# Patient Record
Sex: FEMALE | Race: BLACK OR AFRICAN AMERICAN | NOT HISPANIC OR LATINO
[De-identification: names, ages, dates, MRNs, and addresses within clinical notes are randomized per-mention and may not be internally consistent; named-entity substitution may affect disease eponyms.]

---

## 2021-08-03 PROBLEM — Z00.00 ENCOUNTER FOR PREVENTIVE HEALTH EXAMINATION: Status: ACTIVE | Noted: 2021-08-03

## 2021-08-04 ENCOUNTER — APPOINTMENT (OUTPATIENT)
Dept: VASCULAR SURGERY | Facility: CLINIC | Age: 66
End: 2021-08-04

## 2022-10-05 ENCOUNTER — APPOINTMENT (OUTPATIENT)
Dept: VASCULAR SURGERY | Facility: CLINIC | Age: 67
End: 2022-10-05

## 2022-10-05 ENCOUNTER — NON-APPOINTMENT (OUTPATIENT)
Age: 67
End: 2022-10-05

## 2022-10-05 VITALS — WEIGHT: 244 LBS

## 2022-10-05 DIAGNOSIS — R60.0 LOCALIZED EDEMA: ICD-10-CM

## 2022-10-05 PROCEDURE — 99203 OFFICE O/P NEW LOW 30 MIN: CPT

## 2022-10-05 NOTE — HISTORY OF PRESENT ILLNESS
[FreeTextEntry1] : 67-year-old female with past medical history of hypertension presented for an evaluation of bilateral lower extremity edema associated with pain..  The symptoms are  chronic, present for years. Progressively worsened over time. Patient complains of tenderness, heaviness, burning and significant discomfort in legs.  The symptoms are least pronounced in the morning and worsens during the course of the day. Alleviated by leg elevation. Exacerbated by prolonged standing, activity.\par Patient attempted compressive therapy in the past with only a marginal improvement of the symptoms.\par No history of cellulitis, ulcers. \par No erythema. \par \par

## 2022-10-05 NOTE — REVIEW OF SYSTEMS
[As Noted in HPI] : as noted in HPI [Leg Claudication] : no intermittent leg claudication [Lower Ext Edema] : lower extremity edema [Negative] : Heme/Lymph

## 2022-10-05 NOTE — PHYSICAL EXAM
[JVD] : no jugular venous distention  [Calm] : calm [de-identified] : NAD.  Awake alert and oriented. [de-identified] : Normocephalic atraumatic.  Extraocular muscles intact. [FreeTextEntry1] : 2+ bilateral carotid, radial, femoral, popliteal, DP and PT pulses.\par Bilateral lower extremity 2+ edema.  Right worse than left.  Extensive venous stasis changes of bilateral lower extremity.  Multiple healed ulcers of right lower extremity.\par \par  [de-identified] : Soft, NT, ND. No guarding. No palpable masses. No HSM [de-identified] : Normal muscle bulk and tone. FROM in all extremities. [de-identified] : Right lower extremity multiple healed venous stasis ulcers.  Extensive venous stasis changes of bilateral lower extremities.  Right worse than left. [de-identified] : AAOx3, CN II-XII intact. Strength 5/5 in all 4 extremities. Sensation intact throughout.

## 2022-10-05 NOTE — ASSESSMENT
[FreeTextEntry1] : 67-year-old female b/l LE symptomatic edema with healed ulcerations. We discussed potential etiologies as well as treatment options such as endovenous ablation, Varithena, and stab phlebectomy.  Patient understans the risks of intevention, including but not limited to bleeding, infection, permanent nerve damage, distal embolization, EHIT, and skin discoloration.\par Patient will attempt a course of non-operative management with leg compression and elevation.  \par Skin care with moisturizers. \par Follow up in the office in 4-6 weeks or sooner as needed. \par